# Patient Record
Sex: FEMALE
[De-identification: names, ages, dates, MRNs, and addresses within clinical notes are randomized per-mention and may not be internally consistent; named-entity substitution may affect disease eponyms.]

---

## 2019-07-23 ENCOUNTER — RX ONLY (OUTPATIENT)
Age: 24
Setting detail: RX ONLY
End: 2019-07-23

## 2023-08-10 ENCOUNTER — POST-OPERATIVE VISIT (OUTPATIENT)
Dept: URBAN - METROPOLITAN AREA CLINIC 4 | Facility: CLINIC | Age: 28
End: 2023-08-10

## 2023-08-10 DIAGNOSIS — Z48.810 ENCOUNTER FOR SURGICAL AFTERCARE FOLLOWING SURGERY ON A SENSE ORGAN: Primary | ICD-10-CM

## 2023-08-10 PROCEDURE — 99024 POSTOP FOLLOW-UP VISIT: CPT | Performed by: OPHTHALMOLOGY

## 2023-09-05 ENCOUNTER — POST-OPERATIVE VISIT (OUTPATIENT)
Dept: URBAN - METROPOLITAN AREA CLINIC 4 | Facility: CLINIC | Age: 28
End: 2023-09-05

## 2023-09-05 DIAGNOSIS — Z48.810 ENCOUNTER FOR SURGICAL AFTERCARE FOLLOWING SURGERY ON A SENSE ORGAN: Primary | ICD-10-CM

## 2023-09-05 PROCEDURE — 99024 POSTOP FOLLOW-UP VISIT: CPT | Performed by: OPHTHALMOLOGY

## 2023-09-05 ASSESSMENT — INTRAOCULAR PRESSURE
OS: 10
OD: 12

## 2023-09-26 ENCOUNTER — POST-OPERATIVE VISIT (OUTPATIENT)
Dept: URBAN - METROPOLITAN AREA CLINIC 4 | Facility: CLINIC | Age: 28
End: 2023-09-26

## 2023-09-26 DIAGNOSIS — Z48.810 ENCOUNTER FOR SURGICAL AFTERCARE FOLLOWING SURGERY ON THE SENSE ORGANS: Primary | ICD-10-CM

## 2023-09-26 PROCEDURE — 99024 POSTOP FOLLOW-UP VISIT: CPT | Performed by: OPHTHALMOLOGY

## 2023-10-30 ENCOUNTER — POST-OPERATIVE VISIT (OUTPATIENT)
Dept: URBAN - METROPOLITAN AREA CLINIC 4 | Facility: CLINIC | Age: 28
End: 2023-10-30

## 2023-10-30 DIAGNOSIS — Z48.810 ENCOUNTER FOR SURGICAL AFTERCARE FOLLOWING SURGERY ON A SENSE ORGAN: Primary | ICD-10-CM

## 2023-10-30 PROCEDURE — 99024 POSTOP FOLLOW-UP VISIT: CPT | Performed by: OPHTHALMOLOGY

## 2023-10-30 ASSESSMENT — INTRAOCULAR PRESSURE
OS: 12
OD: 13

## 2023-10-30 ASSESSMENT — KERATOMETRY
OD: 37.25
OS: 37.38

## 2024-03-18 ENCOUNTER — POST-OPERATIVE VISIT (OUTPATIENT)
Dept: URBAN - METROPOLITAN AREA CLINIC 4 | Facility: CLINIC | Age: 29
End: 2024-03-18

## 2024-03-18 DIAGNOSIS — Z48.810 ENCOUNTER FOR SURGICAL AFTERCARE FOLLOWING SURGERY ON A SENSE ORGAN: Primary | ICD-10-CM

## 2024-03-18 PROCEDURE — 99024 POSTOP FOLLOW-UP VISIT: CPT | Performed by: OPHTHALMOLOGY

## 2024-06-23 ENCOUNTER — RX ONLY (OUTPATIENT)
Age: 29
Setting detail: RX ONLY
End: 2024-06-23

## 2024-07-24 ENCOUNTER — APPOINTMENT (RX ONLY)
Dept: URBAN - METROPOLITAN AREA CLINIC 129 | Facility: CLINIC | Age: 29
Setting detail: DERMATOLOGY
End: 2024-07-24

## 2024-07-24 DIAGNOSIS — D22 MELANOCYTIC NEVI: ICD-10-CM

## 2024-07-24 DIAGNOSIS — L57.8 OTHER SKIN CHANGES DUE TO CHRONIC EXPOSURE TO NONIONIZING RADIATION: ICD-10-CM

## 2024-07-24 PROBLEM — D22.61 MELANOCYTIC NEVI OF RIGHT UPPER LIMB, INCLUDING SHOULDER: Status: ACTIVE | Noted: 2024-07-24

## 2024-07-24 PROBLEM — D22.5 MELANOCYTIC NEVI OF TRUNK: Status: ACTIVE | Noted: 2024-07-24

## 2024-07-24 PROBLEM — D22.72 MELANOCYTIC NEVI OF LEFT LOWER LIMB, INCLUDING HIP: Status: ACTIVE | Noted: 2024-07-24

## 2024-07-24 PROBLEM — D22.62 MELANOCYTIC NEVI OF LEFT UPPER LIMB, INCLUDING SHOULDER: Status: ACTIVE | Noted: 2024-07-24

## 2024-07-24 PROBLEM — D22.71 MELANOCYTIC NEVI OF RIGHT LOWER LIMB, INCLUDING HIP: Status: ACTIVE | Noted: 2024-07-24

## 2024-07-24 PROCEDURE — ? SUNSCREEN RECOMMENDATIONS

## 2024-07-24 PROCEDURE — 99203 OFFICE O/P NEW LOW 30 MIN: CPT

## 2024-07-24 PROCEDURE — ? COUNSELING

## 2024-07-24 ASSESSMENT — LOCATION DETAILED DESCRIPTION DERM
LOCATION DETAILED: LEFT INFERIOR CENTRAL MALAR CHEEK
LOCATION DETAILED: LEFT PROXIMAL MEDIAL POSTERIOR UPPER ARM
LOCATION DETAILED: LEFT DISTAL CALF
LOCATION DETAILED: LEFT PROXIMAL POSTERIOR UPPER ARM
LOCATION DETAILED: RIGHT ANTERIOR PROXIMAL THIGH
LOCATION DETAILED: RIGHT DISTAL CALF
LOCATION DETAILED: RIGHT PROXIMAL POSTERIOR UPPER ARM
LOCATION DETAILED: LEFT ANTERIOR PROXIMAL THIGH
LOCATION DETAILED: LEFT ANTERIOR DISTAL THIGH
LOCATION DETAILED: STERNAL NOTCH
LOCATION DETAILED: RIGHT INFERIOR CENTRAL MALAR CHEEK
LOCATION DETAILED: INFERIOR THORACIC SPINE

## 2024-07-24 ASSESSMENT — LOCATION SIMPLE DESCRIPTION DERM
LOCATION SIMPLE: LEFT POSTERIOR UPPER ARM
LOCATION SIMPLE: LEFT CALF
LOCATION SIMPLE: RIGHT POSTERIOR UPPER ARM
LOCATION SIMPLE: LEFT CHEEK
LOCATION SIMPLE: RIGHT THIGH
LOCATION SIMPLE: UPPER BACK
LOCATION SIMPLE: LEFT THIGH
LOCATION SIMPLE: RIGHT CALF
LOCATION SIMPLE: CHEST
LOCATION SIMPLE: RIGHT CHEEK

## 2024-07-24 ASSESSMENT — LOCATION ZONE DERM
LOCATION ZONE: TRUNK
LOCATION ZONE: FACE
LOCATION ZONE: ARM
LOCATION ZONE: LEG

## 2024-10-14 VITALS — HEIGHT: 70 IN

## 2024-10-21 DIAGNOSIS — O10.019 ESSENTIAL HYPERTENSION IN PREGNANCY: Primary | ICD-10-CM

## 2024-10-31 ENCOUNTER — CLINICAL SUPPORT (OUTPATIENT)
Dept: MATERNAL FETAL MEDICINE | Facility: CLINIC | Age: 29
End: 2024-10-31
Payer: OTHER GOVERNMENT

## 2024-10-31 VITALS
DIASTOLIC BLOOD PRESSURE: 64 MMHG | HEIGHT: 70 IN | WEIGHT: 206 LBS | SYSTOLIC BLOOD PRESSURE: 122 MMHG | RESPIRATION RATE: 20 BRPM | BODY MASS INDEX: 29.49 KG/M2 | HEART RATE: 78 BPM

## 2024-10-31 DIAGNOSIS — O10.019 ESSENTIAL HYPERTENSION IN PREGNANCY: ICD-10-CM

## 2024-10-31 RX ORDER — LABETALOL 100 MG/1
100 TABLET, FILM COATED ORAL
COMMUNITY
Start: 2024-07-26

## 2024-10-31 RX ORDER — ASPIRIN 81 MG/1
TABLET ORAL
COMMUNITY
Start: 2024-09-26

## 2024-10-31 RX ORDER — PYRIDOXINE HCL (VITAMIN B6) 25 MG
TABLET ORAL
COMMUNITY
Start: 2024-09-26

## 2024-10-31 RX ORDER — ALBUTEROL SULFATE 90 UG/1
INHALANT RESPIRATORY (INHALATION)
COMMUNITY
Start: 2024-04-22 | End: 2024-11-08

## 2024-11-25 DIAGNOSIS — O10.019 ESSENTIAL HYPERTENSION IN PREGNANCY: Primary | ICD-10-CM

## 2024-12-05 ENCOUNTER — PROCEDURE VISIT (OUTPATIENT)
Dept: MATERNAL FETAL MEDICINE | Facility: CLINIC | Age: 29
End: 2024-12-05
Payer: OTHER GOVERNMENT

## 2024-12-05 VITALS
WEIGHT: 214 LBS | HEART RATE: 96 BPM | BODY MASS INDEX: 30.71 KG/M2 | DIASTOLIC BLOOD PRESSURE: 60 MMHG | SYSTOLIC BLOOD PRESSURE: 108 MMHG | OXYGEN SATURATION: 98 % | RESPIRATION RATE: 20 BRPM

## 2024-12-05 DIAGNOSIS — O10.019 ESSENTIAL HYPERTENSION IN PREGNANCY: ICD-10-CM

## 2024-12-05 NOTE — LETTER
December 5, 2024        [facility] - Maternal Fetal Medicine  [address]  [place] [address]  Phone: [phone]  Fax: [phone]   Patient: Baylee Daniel   MR Number: [id number]   YOB: 1995   Date of Visit: 12/5/2024       Dear Ms Diggsvale Singh:    Thank you for referring Baylee Daniel to me for evaluation. Below are the relevant portions of my assessment and plan of care.            If you have questions, please do not hesitate to call me. I look forward to following Baylee along with you.    Sincerely,      Vidal Gutierrez MD           CC  [employer]

## 2024-12-05 NOTE — PROGRESS NOTES
Reason For Consultation    Pregnancy at 27w1d weeks gestation   Chronic hypertension requiring medication: Labetalol 100 mg b.i.d.    Provider requesting consultation:  Myra Singh CNM    Dear Ms. Singh,    I had the pleasure of seeing Baylee Daniel at the [facility] center. I appreciate your request for follow up imaging and consultation.   Your patient is a 29 y.o. at 27w1d.  Estimated Date of Delivery: 3/5/25.  Today, your patient states that she is doing well.  She does not show signs or symptoms of preeclampsia or of premature labor.    Review of systems:  The patient denies vaginal bleeding, leakage of amniotic fluid, contractions, headache, visual change, and right upper quadrant pain.    Physical exam:  Vitals:    24 0951   BP: 108/60   Pulse: 96   Resp: 20   SpO2: 98%   Weight: 97.1 kg (214 lb)     Body mass index is 30.71 kg/m².    General:  The patient is a well developed gravid female in no apparent distress.  HEENT: Grossly within normal limits.  No facial edema.  The sclera appear normal.  Abdomen: Exam is benign.  Uterus non tender to palpation.  No masses noted  Extremities: No significant peripheral edema  Neuro: Grossly intact.  Alert and oriented.  Ambulates without issue  Psych:  The patient is appropriate for both mood and affect    Ultrasound: A repeat maternal fetal medicine ultrasound was performed today.  Please SEE THE FULL ULTRASOUND REPORT IN THE EPIC SYSTEM for full details.  In summary, imaging today is reassuring.  This is because the baby is normally grown with normal anatomy and fluid.  Baby is active.  Fetal growth is at the 50th percentile.      Impression:  My impression is that both mother and baby are doing well.  The patient is blood pressure is well-controlled on a low dose of labetalol.  The fetal assessment is reassuring with regards to growth, fluid, and anatomy.    Recommendations:  Continue 81 mg of aspirin and labetalol as  prescribed.  Your patient should continue to have an active lifestyle and eat a well-rounded diet   The patient is a candidate for antepartum testing from 30/2 weeks onward  With your permission we will see the patient back here in the [facility] in 7 weeks.    We greatly appreciate you allowing us to assist in her care.  Please call with any questions or concerns.    Sincerely,     Vidal Montelongo MD  Maternal Fetal Medicine    I spent a total of 23 minutes on this visit. This includes face to face time and non-face to face time preparing to see the patient (eg, review of tests), obtaining and/or reviewing separately obtained history, documenting clinical information in the electronic or other health record, independently interpreting results and communicating results to the patient/family/caregiver, or care coordinator.

## 2025-01-09 DIAGNOSIS — O10.019 ESSENTIAL HYPERTENSION IN PREGNANCY: Primary | ICD-10-CM

## 2025-02-06 ENCOUNTER — CLINICAL SUPPORT (OUTPATIENT)
Dept: MATERNAL FETAL MEDICINE | Facility: CLINIC | Age: 30
End: 2025-02-06
Payer: OTHER GOVERNMENT

## 2025-02-06 VITALS
DIASTOLIC BLOOD PRESSURE: 78 MMHG | BODY MASS INDEX: 32 KG/M2 | SYSTOLIC BLOOD PRESSURE: 102 MMHG | OXYGEN SATURATION: 99 % | HEART RATE: 128 BPM | WEIGHT: 223 LBS | RESPIRATION RATE: 20 BRPM

## 2025-02-06 DIAGNOSIS — O10.019 ESSENTIAL HYPERTENSION IN PREGNANCY: ICD-10-CM

## 2025-02-06 RX ORDER — LEVOTHYROXINE SODIUM 25 UG/1
25 TABLET ORAL
COMMUNITY
Start: 2024-12-11

## 2025-02-06 RX ORDER — ACETAMINOPHEN 500 MG
TABLET ORAL
COMMUNITY
Start: 2024-06-13 | End: 2025-06-14

## 2025-02-06 NOTE — PROGRESS NOTES
Baylee is here for followup [facility] consultation for essential hypertension in pregnancy, referred by Myra Singh CNM at [facility].    The patient is currently taking Labetalol 100 mg PO BID for blood pressure control.  She is taking a daily low dose aspirin currently.    She is feeling fetal movement.    Baylee denies vaginal bleeding, loss of fluid, recurrent contractions.    The patient denies visual changes or right upper quadrant pain, but does have occasional headache relieved with resting. She also states that lately she has been having random occurances of her heart racing, but denies any SOB, chest pain or radiating pain.    She also takes Magnesium Oxide and has an Albuterol rescue inhaler, not used very often per her report.    Vitals:    02/06/25 1054   BP: 102/78   Pulse: (!) 128  Comment: pt reports random episodes of heart racing, no SOB or chest pain   Resp: 20   SpO2: 99%   Weight: 101.2 kg (223 lb)     BMI:                    32 kg/m^2

## 2025-02-06 NOTE — PROGRESS NOTES
Follow-up MFM Consultation via Telemedicine  Referring provider: Myra Singh CNM    Indications for referral:  1) Pregnancy at 36w1d (with an Estimated Date of Delivery: 3/5/25)  2) Chronic hypertension on labetalol    Dear Ms. Singh ,  Thank you for your kind request for consultation and imaging of your patient at the [facility] at [facility].  There have been no changes in her history since her last visit here. She has no complaints today.    This encounter was via the benefit of telemedicine. I was located at [facility] [place], [place] and the patient was located at [facility] in [place], [place]. Physical examination was deferred due to telemedicine.      Physical Exam  Vital signs: /78   Pulse (!) 128 Comment: pt reports random episodes of heart racing, no SOB or chest pain  Resp 20   Wt 101.2 kg (223 lb)   SpO2 99%   BMI 32.00 kg/m²   General: Age appearing female in no apparent distress.  Exam deferred due to telemedicine    ULTRASOUND FINDINGS:  A repeat ultrasound was performed. The fetus is cephalic. EFW of 2983 grams is at the 49th percentile.  The placenta is posterior.  Amniotic fluid is normal. There are no fetal structural malformations to extent of view, but some anatomy was suboptimal today due to advanced gestation.  Biophysical profile is 8/8. Please see accompanying detailed [facility] report.    IMPRESSION:     1) 36 week gestation  2) Chronic hypertension requiring medical management  3) Reassuring fetal growth, anatomy, and wellbeing    RECOMMENDATIONS/DISCUSSION:  1) We discussed the reassuring sonographic findings.  2) Her HTN is well controlled on a very low dose of labetalol and the pregnancy is progressing well.  She should continue to have  testing in your office until delivery.  I recommend delivery at 37-39 weeks.  A specific date can be determined by you and the patient.    Thank you for allowing us to participate in her  care.  Please do not hesitate to call with questions.  For any questions feel free to call our oncall MFM 24/7 at [phone].  -Rose Salinas MD

## 2025-02-06 NOTE — LETTER
February 6, 2025        [facility] - Maternal Fetal Medicine  [address] [place] [address]  Phone: [phone]  Fax: [phone]   Patient: Baylee Daniel   MR Number: [id number]   YOB: 1995   Date of Visit: 2/6/2025       Dear Ms. Diggse Singh:    Thank you for referring Baylee Daniel to me for evaluation. Below are the relevant portions of my assessment and plan of care.            If you have questions, please do not hesitate to call me. I look forward to following Baylee along with you.    Sincerely,      Rose Salinas MD           [facility]      
No Exposure/No Disease